# Patient Record
Sex: FEMALE | Race: AMERICAN INDIAN OR ALASKA NATIVE
[De-identification: names, ages, dates, MRNs, and addresses within clinical notes are randomized per-mention and may not be internally consistent; named-entity substitution may affect disease eponyms.]

---

## 2021-08-09 ENCOUNTER — HOSPITAL ENCOUNTER (OUTPATIENT)
Dept: HOSPITAL 5 - TRG | Age: 26
Discharge: HOME | End: 2021-08-09
Attending: OBSTETRICS & GYNECOLOGY
Payer: MEDICAID

## 2021-08-09 VITALS — SYSTOLIC BLOOD PRESSURE: 105 MMHG | DIASTOLIC BLOOD PRESSURE: 64 MMHG

## 2021-08-09 DIAGNOSIS — Z3A.34: ICD-10-CM

## 2021-08-09 DIAGNOSIS — O36.8130: Primary | ICD-10-CM

## 2021-08-09 LAB
BILIRUB UR QL STRIP: (no result)
BLOOD UR QL VISUAL: (no result)
PH UR STRIP: 7 [PH] (ref 5–7)
PROT UR STRIP-MCNC: (no result) MG/DL
RBC #/AREA URNS HPF: 1 /HPF (ref 0–6)
UROBILINOGEN UR-MCNC: 2 MG/DL (ref ?–2)
WBC #/AREA URNS HPF: 3 /HPF (ref 0–6)

## 2021-08-09 PROCEDURE — 76819 FETAL BIOPHYS PROFIL W/O NST: CPT

## 2021-08-09 PROCEDURE — 81001 URINALYSIS AUTO W/SCOPE: CPT

## 2021-08-09 PROCEDURE — 59025 FETAL NON-STRESS TEST: CPT

## 2021-08-09 PROCEDURE — 76815 OB US LIMITED FETUS(S): CPT

## 2021-08-09 NOTE — ULTRASOUND REPORT
ULTRASOUND OBSTETRIC LIMITED 

ULTRASOUND FETAL BIOPHYSICAL PROFILE



INDICATION / CLINICAL INFORMATION:

Decreased fetal movement.



COMPARISON:

None available.



FINDINGS:



FETAL BREATHING MOVEMENT = 2

GROSS BODY MOVEMENT = 2

FETAL TONE = 2

QUALITATIVE AMNIOTIC FLUID VOLUME = 2



TOTAL BIOPHYSICAL SCORE = 8/8



AMNIOTIC FLUID INDEX (cm) =  not measured

PRESENTATION: Breech. 

FETAL HEART RATE (beats per minute): 129 



ADDITIONAL FINDINGS: None.



IMPRESSION:

1. Fetal Biophysical Score = 8/8



Signer Name: Raji Cobian MD 

Signed: 8/9/2021 8:01 PM

Workstation Name: [email or web address]

## 2021-08-10 NOTE — ULTRASOUND REPORT
ULTRASOUND OBSTETRIC LIMITED 



INDICATION / CLINICAL INFORMATION: UYEN.



TECHNIQUE: Transabdominal ultrasound imaging.



COMPARISON: None available.



FINDINGS:



FETAL HEART RATE (beats per minute): 129 



AMNIOTIC FLUID INDEX (cm) =  8.7



PRESENTATION: Breech. 



ADDITIONAL FINDINGS: None.



IMPRESSION:

UYEN measures 8.7 cm. Breech presentation.



Signer Name: Jose L Whitehead Jr, MD 

Signed: 8/10/2021 7:31 AM

Workstation Name: [id number]

## 2021-08-29 ENCOUNTER — HOSPITAL ENCOUNTER (OUTPATIENT)
Dept: HOSPITAL 5 - TRG | Age: 26
Discharge: HOME | End: 2021-08-29
Attending: OBSTETRICS & GYNECOLOGY
Payer: MEDICAID

## 2021-08-29 VITALS — SYSTOLIC BLOOD PRESSURE: 106 MMHG | DIASTOLIC BLOOD PRESSURE: 64 MMHG

## 2021-08-29 DIAGNOSIS — Z3A.37: ICD-10-CM

## 2021-08-29 DIAGNOSIS — O47.1: Primary | ICD-10-CM

## 2021-08-29 DIAGNOSIS — O42.92: ICD-10-CM

## 2021-08-29 PROCEDURE — 36415 COLL VENOUS BLD VENIPUNCTURE: CPT

## 2021-08-29 PROCEDURE — 76815 OB US LIMITED FETUS(S): CPT

## 2021-08-29 PROCEDURE — 76819 FETAL BIOPHYS PROFIL W/O NST: CPT

## 2021-08-29 PROCEDURE — 59025 FETAL NON-STRESS TEST: CPT

## 2021-08-29 PROCEDURE — 84112 EVAL AMNIOTIC FLUID PROTEIN: CPT

## 2021-08-29 NOTE — ULTRASOUND REPORT
LIMITED OBSTETRICAL ULTRASOUND WITH BIOPHYSICAL PROFILE



HISTORY: Evaluate UYEN.



FINDINGS: A single viable intrauterine pregnancy in the cephalic position has fetal heart tones of 13
0 bpm. Amniotic fluid index is normal at 10.3. Largest pocket is quadrant 4 (3.3 cm).



Biophysical profile is normal at 8/8.



IMPRESSION:

1. Single viable intrauterine pregnancy with normal UYEN.

2. Biophysical profile normal at 8/8.







Signer Name: Santy Gleason MD 

Signed: 8/29/2021 11:49 PM

Workstation Name: [id number]

## 2021-08-29 NOTE — ULTRASOUND REPORT
LIMITED OBSTETRICAL ULTRASOUND WITH BIOPHYSICAL PROFILE



HISTORY: Evaluate UYEN.



FINDINGS: A single viable intrauterine pregnancy in the cephalic position has fetal heart tones of 13
0 bpm. Amniotic fluid index is normal at 10.3. Largest pocket is quadrant 4 (3.3 cm).



Biophysical profile is normal at 8/8.



IMPRESSION:

1. Single viable intrauterine pregnancy with normal UYEN.

2. Biophysical profile normal at 8/8.







Signer Name: Santy Gleason MD 

Signed: 8/29/2021 11:49 PM

Workstation Name: [id number]

## 2021-09-05 ENCOUNTER — HOSPITAL ENCOUNTER (OUTPATIENT)
Dept: HOSPITAL 5 - TRG | Age: 26
Discharge: LEFT BEFORE DISCHARGE | End: 2021-09-05
Attending: OBSTETRICS & GYNECOLOGY
Payer: MEDICAID

## 2021-09-05 VITALS — SYSTOLIC BLOOD PRESSURE: 106 MMHG | DIASTOLIC BLOOD PRESSURE: 67 MMHG

## 2021-09-05 DIAGNOSIS — Z3A.38: ICD-10-CM

## 2021-09-05 DIAGNOSIS — O47.1: Primary | ICD-10-CM

## 2021-09-05 PROCEDURE — 59025 FETAL NON-STRESS TEST: CPT

## 2021-09-13 ENCOUNTER — HOSPITAL ENCOUNTER (INPATIENT)
Dept: HOSPITAL 5 - TRG | Age: 26
LOS: 2 days | Discharge: HOME | End: 2021-09-15
Attending: OBSTETRICS & GYNECOLOGY | Admitting: OBSTETRICS & GYNECOLOGY
Payer: MEDICAID

## 2021-09-13 DIAGNOSIS — Z3A.39: ICD-10-CM

## 2021-09-13 DIAGNOSIS — Z20.822: ICD-10-CM

## 2021-09-13 LAB
HCT VFR BLD CALC: 39.9 % (ref 30.3–42.9)
HGB BLD-MCNC: 13.4 GM/DL (ref 10.1–14.3)
MCHC RBC AUTO-ENTMCNC: 34 % (ref 30–34)
MCV RBC AUTO: 86 FL (ref 79–97)
PLATELET # BLD: 151 K/MM3 (ref 140–440)
RBC # BLD AUTO: 4.65 M/MM3 (ref 3.65–5.03)

## 2021-09-13 PROCEDURE — 85018 HEMOGLOBIN: CPT

## 2021-09-13 PROCEDURE — 85014 HEMATOCRIT: CPT

## 2021-09-13 PROCEDURE — 86900 BLOOD TYPING SEROLOGIC ABO: CPT

## 2021-09-13 PROCEDURE — 85027 COMPLETE CBC AUTOMATED: CPT

## 2021-09-13 PROCEDURE — G0463 HOSPITAL OUTPT CLINIC VISIT: HCPCS

## 2021-09-13 PROCEDURE — 76815 OB US LIMITED FETUS(S): CPT

## 2021-09-13 PROCEDURE — U0003 INFECTIOUS AGENT DETECTION BY NUCLEIC ACID (DNA OR RNA); SEVERE ACUTE RESPIRATORY SYNDROME CORONAVIRUS 2 (SARS-COV-2) (CORONAVIRUS DISEASE [COVID-19]), AMPLIFIED PROBE TECHNIQUE, MAKING USE OF HIGH THROUGHPUT TECHNOLOGIES AS DESCRIBED BY CMS-2020-01-R: HCPCS

## 2021-09-13 PROCEDURE — 99211 OFF/OP EST MAY X REQ PHY/QHP: CPT

## 2021-09-13 PROCEDURE — 86901 BLOOD TYPING SEROLOGIC RH(D): CPT

## 2021-09-13 PROCEDURE — 10907ZC DRAINAGE OF AMNIOTIC FLUID, THERAPEUTIC FROM PRODUCTS OF CONCEPTION, VIA NATURAL OR ARTIFICIAL OPENING: ICD-10-PCS | Performed by: OBSTETRICS & GYNECOLOGY

## 2021-09-13 PROCEDURE — 76819 FETAL BIOPHYS PROFIL W/O NST: CPT

## 2021-09-13 PROCEDURE — 36415 COLL VENOUS BLD VENIPUNCTURE: CPT

## 2021-09-13 PROCEDURE — 86850 RBC ANTIBODY SCREEN: CPT

## 2021-09-13 RX ADMIN — SODIUM CHLORIDE, SODIUM LACTATE, POTASSIUM CHLORIDE, AND CALCIUM CHLORIDE SCH MLS/HR: .6; .31; .03; .02 INJECTION, SOLUTION INTRAVENOUS at 18:51

## 2021-09-13 RX ADMIN — SODIUM CHLORIDE, SODIUM LACTATE, POTASSIUM CHLORIDE, AND CALCIUM CHLORIDE SCH MLS/HR: .6; .31; .03; .02 INJECTION, SOLUTION INTRAVENOUS at 22:02

## 2021-09-13 RX ADMIN — SODIUM CHLORIDE, SODIUM LACTATE, POTASSIUM CHLORIDE, AND CALCIUM CHLORIDE SCH MLS/HR: .6; .31; .03; .02 INJECTION, SOLUTION INTRAVENOUS at 17:45

## 2021-09-13 NOTE — PROGRESS NOTE
Subjective





- Subjective


Date of service: 09/13/21


Interval history: 


  


PM rounds


AROM clear fluid


cervix 5cm/80%/-2


[place]: Q3-4 minutes





AOL with oxytocin as needed


Maternal/fetal status reassuring overall


SASKIA Lorenzo MD








Objective





- Vital Signs


Vital Signs: 


                               Vital Signs - 12hr











  09/13/21 09/13/21 09/13/21





  12:15 12:16 12:19


 


Temperature   98.3 F


 


Pulse Rate 90 107 H 95 H


 


Respiratory   20





Rate   


 


Blood Pressure 101/62  


 


Blood Pressure   101/62





[Right]   


 


O2 Sat by Pulse  98 97





Oximetry   


 


O2 Sat by Pulse   





Oximetry [   





Bilateral]   














  09/13/21 09/13/21 09/13/21





  12:21 12:26 12:31


 


Temperature   


 


Pulse Rate 82 89 78


 


Respiratory   





Rate   


 


Blood Pressure   


 


Blood Pressure   





[Right]   


 


O2 Sat by Pulse 97 98 98





Oximetry   


 


O2 Sat by Pulse   





Oximetry [   





Bilateral]   














  09/13/21 09/13/21 09/13/21





  12:36 12:41 12:46


 


Temperature   


 


Pulse Rate 99 H 83 92 H


 


Respiratory   





Rate   


 


Blood Pressure   


 


Blood Pressure   





[Right]   


 


O2 Sat by Pulse 98 97 100





Oximetry   


 


O2 Sat by Pulse   





Oximetry [   





Bilateral]   














  09/13/21 09/13/21 09/13/21





  12:51 12:56 13:01


 


Temperature   


 


Pulse Rate 87 85 88


 


Respiratory   





Rate   


 


Blood Pressure   


 


Blood Pressure   





[Right]   


 


O2 Sat by Pulse 97 97 96





Oximetry   


 


O2 Sat by Pulse   





Oximetry [   





Bilateral]   














  09/13/21 09/13/21 09/13/21





  13:06 13:11 13:16


 


Temperature   


 


Pulse Rate 81 98 H 92 H


 


Respiratory   





Rate   


 


Blood Pressure   


 


Blood Pressure   





[Right]   


 


O2 Sat by Pulse 97 97 99





Oximetry   


 


O2 Sat by Pulse   





Oximetry [   





Bilateral]   














  09/13/21 09/13/21 09/13/21





  13:21 13:26 13:31


 


Temperature   


 


Pulse Rate 98 H 92 H 89


 


Respiratory   





Rate   


 


Blood Pressure   


 


Blood Pressure   





[Right]   


 


O2 Sat by Pulse 96 95 95





Oximetry   


 


O2 Sat by Pulse   





Oximetry [   





Bilateral]   














  09/13/21 09/13/21 09/13/21





  13:36 13:41 13:46


 


Temperature   


 


Pulse Rate 99 H 103 H 102 H


 


Respiratory   





Rate   


 


Blood Pressure   


 


Blood Pressure   





[Right]   


 


O2 Sat by Pulse 97 98 98





Oximetry   


 


O2 Sat by Pulse   





Oximetry [   





Bilateral]   














  09/13/21 09/13/21 09/13/21





  16:42 16:47 16:52


 


Temperature   


 


Pulse Rate 97 H 99 H 92 H


 


Respiratory   





Rate   


 


Blood Pressure   


 


Blood Pressure   





[Right]   


 


O2 Sat by Pulse 98 98 98





Oximetry   


 


O2 Sat by Pulse   





Oximetry [   





Bilateral]   














  09/13/21 09/13/21 09/13/21





  16:57 17:02 17:07


 


Temperature   


 


Pulse Rate 95 H 98 H 104 H


 


Respiratory   





Rate   


 


Blood Pressure   


 


Blood Pressure   





[Right]   


 


O2 Sat by Pulse 98 97 96





Oximetry   


 


O2 Sat by Pulse   





Oximetry [   





Bilateral]   














  09/13/21 09/13/21 09/13/21





  17:12 17:17 17:22


 


Temperature   


 


Pulse Rate 106 H 102 H 96 H


 


Respiratory   





Rate   


 


Blood Pressure   


 


Blood Pressure   





[Right]   


 


O2 Sat by Pulse 95 96 98





Oximetry   


 


O2 Sat by Pulse   





Oximetry [   





Bilateral]   














  09/13/21 09/13/21 09/13/21





  17:27 17:32 17:37


 


Temperature   


 


Pulse Rate 110 H 100 H 94 H


 


Respiratory   





Rate   


 


Blood Pressure   


 


Blood Pressure   





[Right]   


 


O2 Sat by Pulse 97 99 98





Oximetry   


 


O2 Sat by Pulse   





Oximetry [   





Bilateral]   














  09/13/21 09/13/21 09/13/21





  17:42 17:47 17:52


 


Temperature   


 


Pulse Rate 96 H 96 H 91 H


 


Respiratory   





Rate   


 


Blood Pressure   


 


Blood Pressure   





[Right]   


 


O2 Sat by Pulse 96 95 97





Oximetry   


 


O2 Sat by Pulse   97





Oximetry [   





Bilateral]   














  09/13/21 09/13/21 09/13/21





  17:57 18:05 18:10


 


Temperature   


 


Pulse Rate 107 H 100 H 101 H


 


Respiratory   





Rate   


 


Blood Pressure   


 


Blood Pressure   





[Right]   


 


O2 Sat by Pulse 95 97 97





Oximetry   


 


O2 Sat by Pulse   





Oximetry [   





Bilateral]   














  09/13/21 09/13/21 09/13/21





  18:15 18:20 18:21


 


Temperature   


 


Pulse Rate 95 H 87 96 H


 


Respiratory   





Rate   


 


Blood Pressure   


 


Blood Pressure   





[Right]   


 


O2 Sat by Pulse 95 97 94





Oximetry   


 


O2 Sat by Pulse   





Oximetry [   





Bilateral]   














  09/13/21 09/13/21 09/13/21





  18:25 18:27 18:30


 


Temperature   


 


Pulse Rate 94 H 95 H 98 H


 


Respiratory   





Rate   


 


Blood Pressure   


 


Blood Pressure   





[Right]   


 


O2 Sat by Pulse 96 94 97





Oximetry   


 


O2 Sat by Pulse   





Oximetry [   





Bilateral]   














  09/13/21 09/13/21 09/13/21





  18:35 18:40 18:45


 


Temperature   


 


Pulse Rate 96 H 89 82


 


Respiratory   





Rate   


 


Blood Pressure   


 


Blood Pressure   





[Right]   


 


O2 Sat by Pulse 97 96 97





Oximetry   


 


O2 Sat by Pulse   





Oximetry [   





Bilateral]   














  09/13/21 09/13/21 09/13/21





  18:50 18:52 18:55


 


Temperature  98.1 F 


 


Pulse Rate 77  82


 


Respiratory   





Rate   


 


Blood Pressure   


 


Blood Pressure   





[Right]   


 


O2 Sat by Pulse 96  95





Oximetry   


 


O2 Sat by Pulse   





Oximetry [   





Bilateral]   














  09/13/21 09/13/21 09/13/21





  19:00 19:05 19:10


 


Temperature   


 


Pulse Rate 89 77 98 H


 


Respiratory   





Rate   


 


Blood Pressure   


 


Blood Pressure   





[Right]   


 


O2 Sat by Pulse 96 96 96





Oximetry   


 


O2 Sat by Pulse   





Oximetry [   





Bilateral]   














  09/13/21 09/13/21 09/13/21





  19:11 19:15 19:20


 


Temperature   


 


Pulse Rate 101 H 89 79


 


Respiratory   





Rate   


 


Blood Pressure  115/63 


 


Blood Pressure   





[Right]   


 


O2 Sat by Pulse 94 95 96





Oximetry   


 


O2 Sat by Pulse   





Oximetry [   





Bilateral]   














  09/13/21 09/13/21 09/13/21





  19:25 19:30 19:35


 


Temperature   


 


Pulse Rate 96 H 91 H 89


 


Respiratory   





Rate   


 


Blood Pressure   


 


Blood Pressure   





[Right]   


 


O2 Sat by Pulse 96 96 96





Oximetry   


 


O2 Sat by Pulse   





Oximetry [   





Bilateral]   














  09/13/21 09/13/21 09/13/21





  19:40 19:45 19:50


 


Temperature   


 


Pulse Rate 84 87 86


 


Respiratory   





Rate   


 


Blood Pressure   


 


Blood Pressure   





[Right]   


 


O2 Sat by Pulse 96 97 96





Oximetry   


 


O2 Sat by Pulse   





Oximetry [   





Bilateral]   














  09/13/21 09/13/21 09/13/21





  19:52 19:55 20:00


 


Temperature   


 


Pulse Rate 105 H 86 108 H


 


Respiratory   





Rate   


 


Blood Pressure   


 


Blood Pressure   





[Right]   


 


O2 Sat by Pulse 92 96 97





Oximetry   


 


O2 Sat by Pulse   





Oximetry [   





Bilateral]   














  09/13/21 09/13/21 09/13/21





  20:02 20:05 20:10


 


Temperature   


 


Pulse Rate 105 H 97 H 94 H


 


Respiratory   





Rate   


 


Blood Pressure   


 


Blood Pressure   





[Right]   


 


O2 Sat by Pulse 88 96 98





Oximetry   


 


O2 Sat by Pulse   





Oximetry [   





Bilateral]   














  09/13/21 09/13/21 09/13/21





  20:15 20:19 20:20


 


Temperature   


 


Pulse Rate 100 H 92 H 94 H


 


Respiratory   





Rate   


 


Blood Pressure   


 


Blood Pressure   





[Right]   


 


O2 Sat by Pulse 98 93 97





Oximetry   


 


O2 Sat by Pulse   





Oximetry [   





Bilateral]   














  09/13/21 09/13/21 09/13/21





  20:25 20:30 20:35


 


Temperature   


 


Pulse Rate 93 H 86 91 H


 


Respiratory   





Rate   


 


Blood Pressure   


 


Blood Pressure   





[Right]   


 


O2 Sat by Pulse 97 100 97





Oximetry   


 


O2 Sat by Pulse   





Oximetry [   





Bilateral]   














  09/13/21 09/13/21 09/13/21





  20:40 20:45 20:50


 


Temperature   


 


Pulse Rate 97 H 92 H 86


 


Respiratory   





Rate   


 


Blood Pressure   


 


Blood Pressure   





[Right]   


 


O2 Sat by Pulse 96 96 96





Oximetry   


 


O2 Sat by Pulse   





Oximetry [   





Bilateral]   














  09/13/21 09/13/21 09/13/21





  20:51 20:55 20:58


 


Temperature   


 


Pulse Rate 100 H 106 H 102 H


 


Respiratory   





Rate   


 


Blood Pressure   


 


Blood Pressure   





[Right]   


 


O2 Sat by Pulse 94 97 92





Oximetry   


 


O2 Sat by Pulse   





Oximetry [   





Bilateral]   














  09/13/21 09/13/21 09/13/21





  21:00 21:05 21:08


 


Temperature   


 


Pulse Rate 86 89 91 H


 


Respiratory   





Rate   


 


Blood Pressure   


 


Blood Pressure   





[Right]   


 


O2 Sat by Pulse 97 96 94





Oximetry   


 


O2 Sat by Pulse   





Oximetry [   





Bilateral]   














  09/13/21 09/13/21 09/13/21





  21:10 21:14 21:15


 


Temperature   


 


Pulse Rate 85 97 H 97 H


 


Respiratory   





Rate   


 


Blood Pressure   


 


Blood Pressure   





[Right]   


 


O2 Sat by Pulse 98 94 97





Oximetry   


 


O2 Sat by Pulse   





Oximetry [   





Bilateral]   














  09/13/21 09/13/21 09/13/21





  21:19 21:20 21:24


 


Temperature   


 


Pulse Rate 101 H 98 H 91 H


 


Respiratory   





Rate   


 


Blood Pressure   


 


Blood Pressure   





[Right]   


 


O2 Sat by Pulse 94 92 93





Oximetry   


 


O2 Sat by Pulse   





Oximetry [   





Bilateral]   














  09/13/21 09/13/21 09/13/21





  21:25 21:30 21:35


 


Temperature   


 


Pulse Rate 102 H 112 H 89


 


Respiratory   





Rate   


 


Blood Pressure   


 


Blood Pressure   





[Right]   


 


O2 Sat by Pulse 96 95 99





Oximetry   


 


O2 Sat by Pulse   





Oximetry [   





Bilateral]   














  09/13/21 09/13/21





  21:38 21:40


 


Temperature  


 


Pulse Rate 97 H 98 H


 


Respiratory  





Rate  


 


Blood Pressure  


 


Blood Pressure  





[Right]  


 


O2 Sat by Pulse 93 99





Oximetry  


 


O2 Sat by Pulse  





Oximetry [  





Bilateral]  














- Labs


Labs: 


                                  Abnormal Labs











  09/13/21





  16:40


 


RDW  20.4 H








                         Laboratory Results - last 24 hr











  09/13/21 09/13/21





  16:00 16:40


 


WBC   7.2


 


RBC   4.65


 


Hgb   13.4


 


Hct   39.9


 


MCV   86


 


MCH   29


 


MCHC   34


 


RDW   20.4 H


 


Plt Count   151


 


Blood Type  O POSITIVE 


 


Antibody Screen  Negative

## 2021-09-13 NOTE — ULTRASOUND REPORT
US OB fetal BPP wo non-stress, US OB limited



INDICATION:

c/o decreased fm.



TECHNIQUE:

Transabdominal.



COMPARISON:

None available.



FINDINGS:

There is a single intrauterine pregnancy. 



Fetal Heart Rate: 143  beats per minute. 



Fetal Position: cephalic. 



Amniotic Fluid Volume: normal 

Amniotic Fluid Index (UYEN) in cm (if calculated): 14.8 cm.



Biophysical Profile:



Fetal breathing movements: 2

Fetal movements:2

Fetal posture and tone:2

Qualitative amniotic fluid volume: 2





IMPRESSION:

1. Amniotic fluid is normal.

2. Biophysical profile is 8 of 8



Signer Name: Abner Scott MD 

Signed: 9/13/2021 2:15 PM

Workstation Name: [email or web address]

## 2021-09-13 NOTE — HISTORY AND PHYSICAL REPORT
History of Present Illness


Date of examination: 21


Chief complaint: 





early labor at term


History of present illness: 





 25yo  at 39.2 weeks admitted with painful contraction


PNC at [facility]











Past History


Past Surgical History: no surgical history


Family/Genetic History: none


Social history: no significant social history





- Obstetrical History


Expected Date of Delivery: 21


Actual Gestation: 39 Week(s) 4 Day(s) 


: 3


Para: 1





Medications and Allergies


                                    Allergies











Allergy/AdvReac Type Severity Reaction Status Date / Time


 


No Known Allergies Allergy   Verified 19 17:57











                                Home Medications











 Medication  Instructions  Recorded  Confirmed  Last Taken  Type


 


Ibuprofen [Motrin 800 MG tab] 800 mg PO Q8HR PRN #30 tablet 19  Unknown Rx


 


medroxyPROGESTERone ACETATE 10 mg PO DAILY #10 tablet 19  Unknown Rx





[Provera]     














- Vital Signs


Vital signs: 


                                   Vital Signs











Pulse BP


 


 90   101/62 


 


 21 12:15  21 12:15








                                        











Temp Pulse Resp BP Pulse Ox


 


 98.3 F   102 H  20   101/62   98 


 


 21 12:19  21 13:46  21 12:19  21 12:19  21 13:46














- Physical Exam


Breasts: Positive: deferred


Cardiovascular: Regular rate


Lungs: Positive: Clear to auscultation


Abdomen: Positive: normal appearance, soft, normal bowel sounds


Genitourinary (Female): Positive: normal external genitalia


Extremities: Positive: normal


Deep Tendon Reflex Grade: Normal +2





- Obstetrical


FHR: category 1


Cervical Dilatation: 4


Cervical Effacement Percentage: 80


Fetal station: -2





Results


Result Diagrams: 


                                 21 16:40





All other labs normal.








Assessment and Plan





early labor





Plan:


admission


pain meds


GBS protocol as needed


CFM


expect 





Maternal/Fetal well being reassuring overall


SASKIA Lorenzo MD

## 2021-09-13 NOTE — ULTRASOUND REPORT
US OB fetal BPP wo non-stress, US OB limited



INDICATION:

c/o decreased fm.



TECHNIQUE:

Transabdominal.



COMPARISON:

None available.



FINDINGS:

There is a single intrauterine pregnancy. 



Fetal Heart Rate: 143  beats per minute. 



Fetal Position: cephalic. 



Amniotic Fluid Volume: normal 

Amniotic Fluid Index (UYEN) in cm (if calculated): 14.8 cm.



Biophysical Profile:



Fetal breathing movements: 2

Fetal movements:2

Fetal posture and tone:2

Qualitative amniotic fluid volume: 2





IMPRESSION:

1. Amniotic fluid is normal.

2. Biophysical profile is 8 of 8



Signer Name: Abner Scott MD 

Signed: 9/13/2021 2:15 PM

Workstation Name: [employer]

## 2021-09-13 NOTE — PROCEDURE NOTE
OB Delivery Note





- Delivery


Date of Delivery: 21


Surgeon: PAM RAMIREZ


Estimated blood loss: other (250ml)





- Vaginal


Delivery position: OA


Intrapartum events: none


Delivery induction: none


Delivery augmentation: rupture of membranes, pitocin


Delivery monitor: external FHT, external uterine


Route of delivery: 


Delivery placenta: spontaneous


Delivery cord: 3 umbilical vessels


Episiotomy: none


Delivery laceration: none


Delivery comments: 





Patient pushed to deliver a viable female over an intact perineum with weight 

3260gms and APGAR 8/9.  Position ANNI, no nuchal cord.  Spontaneous cry at 

delivery.  Delivery of the anterior shoulder atraumatic, remainder of delivery 

uncomplicated.  Cord clamped cut and baby handed to waiting ANJU team.  

Spontaneous delivery of an intact placenta with three-vessel cord.  Inspection 

of the perineum cervix and vagina revealed no lacerations.  Firm fundus, EBL 

250ml.  All sponge needle and instrument counts correct x2.  Mom and baby stable

to postpartum.


SASKIA Ramirez MD

## 2021-09-14 LAB
HCT VFR BLD CALC: 34.4 % (ref 30.3–42.9)
HGB BLD-MCNC: 11.6 GM/DL (ref 10.1–14.3)

## 2021-09-14 RX ADMIN — IBUPROFEN SCH MG: 600 TABLET, FILM COATED ORAL at 17:45

## 2021-09-14 RX ADMIN — IBUPROFEN SCH MG: 600 TABLET, FILM COATED ORAL at 11:53

## 2021-09-14 RX ADMIN — IBUPROFEN SCH MG: 600 TABLET, FILM COATED ORAL at 01:05

## 2021-09-14 NOTE — PROGRESS NOTE
Assessment and Plan





A: S/P 





p: Continue routine pp care


    Awaiting H&H results


    D/C home tomm if stable





Subjective





- Subjective


Date of service: 21


Principal diagnosis: s/p 


Patient reports: appetite normal, voiding normally, pain well controlled, 

ambulating normally


[place]: nursing well





Objective





- Vital Signs


Latest vital signs: 


                                   Vital Signs











  Temp Pulse Resp BP BP Pulse Ox Pulse Ox


 


 21 11:54  98.6 F  71  20  110/68   96 


 


 21 11:47  98.6 F  74  20  97/55   94 


 


 21 09:12        98


 


 21 07:54  98.5 F  74  16  98/55   97 


 


 21 05:07  98.0 F  69  16  111/60   97 


 


 21 01:20  98.7 F  64  20   123/75  98  98


 


 21 00:48   87     92 


 


 21 00:46   76     96 


 


 21 00:41   69     98 


 


 21 00:38   72   134/76   94 


 


 21 00:36   72     98 


 


 21 00:31   72     96 


 


 21 00:26   72     98 


 


 21 00:23   74   133/70   


 


 21 00:21   73     96 


 


 21 00:16   78     96 


 


 21 00:11   77     97 


 


 21 00:08   76   115/65   


 


 21 00:06   78     98 


 


 21 00:01   82     100 


 


 21 23:57   104 H     87 


 


 21 23:56   102 H     94 


 


 21 23:53   87   114/65   


 


 21 23:51   82     93 


 


 21 23:49   80     94 


 


 21 23:46   80     97 


 


 21 23:41   84     98 


 


 21 23:40   95 H     94 


 


 21 23:37   80   110/57   


 


 21 23:36   81     98 


 


 21 23:34   91 H     94 


 


 21 23:31   84     96 


 


 21 23:26   115 H     97 


 


 21 23:23   90   114/57   


 


 21 23:21   88     95 


 


 21 23:16   96 H     98 


 


 09/13/21 23:15   42 L     81 L 


 


 21 23:11   104 H     93 


 


 21 23:10   104 H     92 


 


 21 23:07   95 H   122/69   


 


 21 23:06   102 H     97 


 


 21 23:05  99.1 F      


 


 21 23:01   100 H     96 


 


 21 23:00   71     93 


 


 21 22:56   90     98 


 


 21 22:54       44 L 


 


 21 22:51   83     99 


 


 21 22:46   102 H     97 


 


 21 22:41   99 H     98 


 


 21 22:36   91 H     100 


 


 21 22:34   108 H     87 


 


 21 22:31   106 H     96 


 


 21 22:26   105 H     97 


 


 21 22:21   97 H     98 


 


 21 22:16   91 H     98 


 


 21 22:11   89     99 


 


 21 22:06   92 H     98 


 


 21 22:01   89     100 


 


 21 21:55   109 H     99 


 


 21 21:50   109 H     99 


 


 21 21:45   92 H     100 


 


 21 21:40   98 H     99 


 


 21 21:38   97 H     93 


 


 21 21:35   89     99 


 


 21 21:30  98.4 F  112 H     95 


 


 21 21:25   102 H     96 


 


 21 21:24   91 H     93 


 


 21 21:20   98 H     92 


 


 21 21:19   101 H     94 


 


 21 21:15   97 H     97 


 


 21 21:14   97 H     94 


 


 21 21:10   85     98 


 


 21 21:08   91 H     94 


 


 21 21:05   89     96 


 


 21 21:00   86     97 


 


 21 20:58   102 H     92 


 


 21 20:55   106 H     97 


 


 21 20:51   100 H     94 


 


 21 20:50   86     96 


 


 21 20:45   92 H     96 


 


 21 20:40   97 H     96 


 


 21 20:35   91 H     97 


 


 21 20:30   86     100 


 


 21 20:25   93 H     97 


 


 21 20:20   94 H     97 


 


 21 20:19   92 H     93 


 


 21 20:15   100 H     98 


 


 21 20:10   94 H     98 


 


 21 20:05   97 H     96 


 


 21 20:02   105 H     88 


 


 21 20:00   108 H     97 


 


 21 19:55   86     96 


 


 21 19:52   105 H     92 


 


 21 19:50   86     96 


 


 21 19:45   87     97 


 


 21 19:40   84     96 


 


 21 19:35   89     96 


 


 21 19:30   91 H     96 


 


 21 19:25   96 H     96 


 


 21 19:20   79     96 


 


 21 19:15  98.7 F  89   115/63   95  95


 


 21 19:11   101 H     94 


 


 21 19:10   98 H     96 


 


 21 19:05   77     96 


 


 21 19:00   89     96 


 


 21 18:55   82     95 


 


 21 18:52  98.1 F      


 


 21 18:50   77     96 


 


 21 18:45   82     97 


 


 21 18:40   89     96 


 


 21 18:35   96 H     97 


 


 21 18:30   98 H     97 


 


 21 18:27   95 H     94 


 


 21 18:25   94 H     96 


 


 21 18:21   96 H     94 


 


 21 18:20   87     97 


 


 21 18:15   95 H     95 


 


 21 18:10   101 H     97 


 


 21 18:05   100 H     97 


 


 21 17:57   107 H     95 


 


 21 17:52   91 H     97  97


 


 21 17:47   96 H     95 


 


 21 17:42   96 H     96 


 


 21 17:37   94 H     98 


 


 21 17:32   100 H     99 


 


 21 17:27   110 H     97 


 


 21 17:22   96 H     98 


 


 21 17:17   102 H     96 


 


 21 17:12   106 H     95 


 


 21 17:07   104 H     96 


 


 21 17:02   98 H     97 


 


 21 16:57   95 H     98 


 


 21 16:52   92 H     98 


 


 21 16:47   99 H     98 


 


 21 16:42   97 H     98 








                                Intake and Output











 21





 06:59 14:59 22:59


 


Intake Total 240 240 


 


Output Total 900  


 


Balance -660 240 


 


Intake:   


 


  Oral  240 


 


  Intake, Free Water 240  


 


Output:   


 


  Urine 900  


 


    Void 900  


 


Other:   


 


  Total, Intake Amount  240 


 


  Total, Output Amount 400  


 


  # Voids   


 


    Void 1 1 


 


  Estimated Blood Loss 250  














- Exam


Breasts: Present: normal


Abdomen: Present: normal appearance, soft, normal bowel sounds


Vulva: both: normal


Uterus: Present: normal, firm, fundal height below umbilicus


Extremities: Present: normal





- Labs


Labs: 


                              Abnormal lab results











  21 Range/Units





  16:40 


 


RDW  20.4 H  (13.2-15.2)  %

## 2021-09-15 VITALS — SYSTOLIC BLOOD PRESSURE: 107 MMHG | DIASTOLIC BLOOD PRESSURE: 75 MMHG

## 2021-09-15 RX ADMIN — IBUPROFEN SCH MG: 600 TABLET, FILM COATED ORAL at 09:40

## 2021-09-15 NOTE — PROGRESS NOTE
Assessment and Plan


A: PP Day #2


     Stable





P: Follow Routine Postpartum Orders


    D/C Home today


    RTO in 6 Weeks








Subjective





- Subjective


Date of service: 09/15/21


Principal diagnosis: s/p 


Patient reports: appetite normal, voiding normally, pain well controlled, 

flatus, ambulating normally


[place]: doing well, bottle feeding (and breastfeeding)





Objective





- Vital Signs


Latest vital signs: 


                                   Vital Signs











  Temp Pulse Resp BP BP Pulse Ox Pulse Ox


 


 09/15/21 09:40    16    


 


 09/15/21 07:45  97.8 F  58 L  18  106/59   99 


 


 09/15/21 00:00  98.4 F  77  18   108/79  


 


 21 20:56        98


 


 21 20:00  98.1 F  74  18  101/60   97 


 


 21 15:40  98.0 F  71  20  107/67   99 


 


 21 11:54  98.6 F  71  20  110/68   96 


 


 21 11:47  98.6 F  74  20  97/55   94 








                                Intake and Output











 09/14/21 09/15/21 09/15/21





 22:59 06:59 14:59


 


Intake Total 240 200 


 


Balance 240 200 


 


Intake:   


 


  Oral 240 200 


 


Other:   


 


  Total, Intake Amount 240 200 


 


  # Voids   


 


    Void 1 1 














- Exam


Breasts: Present: normal


Cardiovascular: Present: Regular rate


Lungs: Present: Clear to auscultation, Normal air movement


Abdomen: Present: normal appearance, soft, normal bowel sounds


Uterus: Present: normal, firm, fundal height below umbilicus


Extremities: Present: normal

## 2021-09-15 NOTE — DISCHARGE SUMMARY
Providers





- Providers


Date of Admission: 


21 23:10





Date of discharge: 09/15/21


Attending physician: 


PAM RAMIREZ MD





Primary care physician: 


PAM RAMIREZ MD








Hospitalization


Reason for admission: active labor


Delivery: 


Episiotomy: none


Laceration: none


Other postpartum procedures: none


Postpartum complications: none


Discharge diagnosis: IUP at term delivered


 baby: female


Condition at discharge: Good


Disposition: 01 HOME / SELF CARE / HOMELESS





Plan





- Discharge Medications


Prescriptions: 


Ibuprofen [Motrin 600 MG tab] 600 mg PO Q6HR PRN #30 tablet


 PRN Reason: Menstrual Cramps





- Provider Discharge Summary


Activity: routine, no sex for 6 weeks, no heavy lifting 4 weeks, no strenuous 

exercise


Diet: routine


Instructions: routine


Additional instructions: 


[]  Smoking cessation referral if applicable(refer to patient education folder 

for contact #)


[]  Refer to [facility] Booklet








Call your doctor immediately for:


* Fever > 100.5


* Heavy vaginal bleeding ( >1 pad per hour)


* Severe persistent headache


* Shortness of breath


* Reddened, hot, painful area to leg or breast


* Drainage or odor from incision.





* Keep incision clean and dry at all times and follow doctor's instructions 

regarding bathing/showering











- Follow up plan


Follow up: 


PAM RAMIREZ MD [Primary Care Provider] - 6 Weeks